# Patient Record
Sex: MALE | Race: WHITE | URBAN - METROPOLITAN AREA
[De-identification: names, ages, dates, MRNs, and addresses within clinical notes are randomized per-mention and may not be internally consistent; named-entity substitution may affect disease eponyms.]

---

## 2023-04-28 ENCOUNTER — HOSPITAL ENCOUNTER (EMERGENCY)
Age: 48
Discharge: HOME | End: 2023-04-28
Attending: EMERGENCY MEDICINE
Payer: MEDICARE

## 2023-04-28 VITALS
HEART RATE: 75 BPM | TEMPERATURE: 98.9 F | HEIGHT: 73 IN | WEIGHT: 180 LBS | RESPIRATION RATE: 18 BRPM | SYSTOLIC BLOOD PRESSURE: 131 MMHG | DIASTOLIC BLOOD PRESSURE: 82 MMHG | OXYGEN SATURATION: 98 % | BODY MASS INDEX: 23.86 KG/M2

## 2023-04-28 DIAGNOSIS — R11.2 NAUSEA AND VOMITING, UNSPECIFIED VOMITING TYPE: ICD-10-CM

## 2023-04-28 DIAGNOSIS — F11.93 OPIOID WITHDRAWAL (HCC): Primary | ICD-10-CM

## 2023-04-28 PROCEDURE — 74011250636 HC RX REV CODE- 250/636: Performed by: EMERGENCY MEDICINE

## 2023-04-28 PROCEDURE — 74011636637 HC RX REV CODE- 636/637: Performed by: EMERGENCY MEDICINE

## 2023-04-28 PROCEDURE — 99283 EMERGENCY DEPT VISIT LOW MDM: CPT

## 2023-04-28 RX ORDER — ONDANSETRON 4 MG/1
4 TABLET, ORALLY DISINTEGRATING ORAL
Qty: 16 TABLET | Refills: 0 | Status: SHIPPED | OUTPATIENT
Start: 2023-04-28

## 2023-04-28 RX ORDER — BUPRENORPHINE AND NALOXONE 8; 2 MG/1; MG/1
1 FILM, SOLUBLE BUCCAL; SUBLINGUAL 2 TIMES DAILY
Qty: 10 FILM | Refills: 0 | Status: SHIPPED | OUTPATIENT
Start: 2023-04-28 | End: 2023-05-03

## 2023-04-28 RX ORDER — BUPRENORPHINE AND NALOXONE 8; 2 MG/1; MG/1
1 FILM, SOLUBLE BUCCAL; SUBLINGUAL
Status: COMPLETED | OUTPATIENT
Start: 2023-04-28 | End: 2023-04-28

## 2023-04-28 RX ORDER — ONDANSETRON 4 MG/1
4 TABLET, ORALLY DISINTEGRATING ORAL
Status: COMPLETED | OUTPATIENT
Start: 2023-04-28 | End: 2023-04-28

## 2023-04-28 RX ADMIN — BUPRENORPHINE AND NALOXONE 1 FILM: 8; 2 FILM BUCCAL; SUBLINGUAL at 11:47

## 2023-04-28 RX ADMIN — ONDANSETRON 4 MG: 4 TABLET, ORALLY DISINTEGRATING ORAL at 11:47

## 2023-04-28 NOTE — ED NOTES
Pt presents to Ed with c/o withdrawal symptoms. Pt recently moved from [place] to withdraw from heroin. Pt last used (snorted heroin) 4/27/23. Pt symptoms include nausea, chills, diarrhea.

## 2023-04-28 NOTE — DISCHARGE INSTRUCTIONS
You were seen in the emergency department for your symptoms. Please use the Zofran for nausea and the Suboxone as prescribed. Please follow-up with addiction medicine to continue Suboxone therapy. Return for new or worse symptoms anytime.

## 2023-04-28 NOTE — ED NOTES
Went over the patients discharge papers, patient verbalized understanding. He states his uncle wont be here for an hour so I showed him the waiting room where he could wait for his ride.   Pt took all his belonging and ambulated tot he waiting room with a steady gate

## 2023-04-28 NOTE — ED PROVIDER NOTES
[facility] EMERGENCY DEPT  EMERGENCY DEPARTMENT ENCOUNTER       Pt Name: Fransisca Cates  MRN: [id number]  [place] 1975  Date of evaluation: 4/28/2023  Provider: Melissa Santana MD   PCP: None  Note Started: 11:23 AM 4/28/23     CHIEF COMPLAINT       Chief Complaint   Patient presents with    Drug Overdose     Pt presents to Ed with c/o withdrawal symptoms. Pt recently moved from [place] to withdraw from heroin. Pt last used (snorted heroin) 4/27/23. Pt symptoms include nausea, chills, diarrhea. HISTORY OF PRESENT ILLNESS: 1 or more elements      History From: patient, History limited by: none     Fransisca Cates is a 51 y.o. male presents with opioid withdrawal.     25-year-old male with a history of opioid abuse who presents with daily heroin use presents requesting detox. Patient reports he last used heroin 1 day ago. Reports moved from [place] due to having \"too many drug dealers on speed dial.\" Patient reports he uses \"10 bags\" of heroin daily. Occasional marijuana abuse, but denies alcohol. Patient reports since his last use, he has began to have tremors and nausea with vomit. He is concerned that his withdrawal will worsen. Denies diarrhea. Denies abdominal pain. Denies chest pain or shortness of breath. Please See MDM for Additional Details of the HPI/PMH  Nursing Notes were all reviewed and agreed with or any disagreements were addressed in the HPI. REVIEW OF SYSTEMS        Positives and Pertinent negatives as per HPI. PAST HISTORY     Past Medical History:  No past medical history on file. Past Surgical History:  No past surgical history on file. Family History:  No family history on file. Social History: Allergies:   Allergies   Allergen Reactions    Codeine Hives    Keppra [Levetiracetam] Hives    Penicillins Hives       CURRENT MEDICATIONS      Discharge Medication List as of 4/28/2023 12:43 PM          SCREENINGS               No data recorded PHYSICAL EXAM      ED Triage Vitals [04/28/23 1102]   ED Encounter Vitals Group      /82      Pulse (Heart Rate) 75      Resp Rate 18      Temp 98.9 °F (37.2 °C)      Temp src       O2 Sat (%) 98 %      Weight 180 lb      Height 6' 1\"        Physical Exam  Vitals and nursing note reviewed. Constitutional:       Comments: 54-year-old male, resting bed, appears mildly comfortable but nontoxic   HENT:      Head: Normocephalic. Cardiovascular:      Rate and Rhythm: Normal rate and regular rhythm. Pulses: Normal pulses. Pulmonary:      Effort: Pulmonary effort is normal.   Abdominal:      General: Abdomen is flat. Tenderness: There is no abdominal tenderness. Musculoskeletal:         General: Normal range of motion. Skin:     General: Skin is warm. Neurological:      General: No focal deficit present. Mental Status: He is alert. Comments: Tremulous   Psychiatric:         Mood and Affect: Mood normal.        DIAGNOSTIC RESULTS   LABS:     No results found for this or any previous visit (from the past 12 hour(s)). EKG: If performed, independent interpretation documented below in the MDM section     RADIOLOGY:  Non-plain film images such as CT, Ultrasound and MRI are read by the radiologist. Plain radiographic images are visualized and preliminarily interpreted by the ED Provider with the findings documented in the MDM section. Interpretation per the Radiologist below, if available at the time of this note:     No results found.       PROCEDURES   Unless otherwise noted below, none  Procedures     CRITICAL CARE TIME   0    EMERGENCY DEPARTMENT COURSE and DIFFERENTIAL DIAGNOSIS/MDM   Vitals:    Vitals:    04/28/23 1102   BP: 131/82   Pulse: 75   Resp: 18   Temp: 98.9 °F (37.2 °C)   SpO2: 98%   Weight: 81.6 kg (180 lb)   Height: 6' 1\" (1.854 m)        Patient was given the following medications:  Medications   buprenorphine-naloxone (SUBOXONE) 8mg-2mg SL film (1 Film SubLINGual Given 4/28/23 1147)   ondansetron (ZOFRAN ODT) tablet 4 mg (4 mg Oral Given 4/28/23 1147)       Medical Decision Making  54-year-old male presents emerged department with a chief complaint of opioid withdrawal.  Vitals are unremarkable. He does appear to have an element of opiate withdrawal with nausea, no vomiting. We will attempt to initiate MAT with Suboxone in the ED. Holding on labs at this time. Patient's presentation confounded by social determinants of health including polysubstance abuse and poor support. Exam is fairly unremarkable with tremor noted and nausea. Risk  Prescription drug management. Diagnosis or treatment significantly limited by social determinants of health. ED Course as of 04/28/23 1424   Fri Apr 28, 2023   1229 Reassessed, symptoms improving. [MB]   1231 Consult with pharmacy for dosing of suboxone and initiation of MAT. [MB]   1231 Consult via [employer] with Dr. Kavitha Barriga for close outpatient follow-up and hand off. [MB]      ED Course User Index  [MB] Elia Le MD         FINAL IMPRESSION     1. Opioid withdrawal ([address]    2. Nausea and vomiting, unspecified vomiting type          DISPOSITION/PLAN   Odell Perry  results have been reviewed with him. He has been counseled regarding his diagnosis, treatment, and plan. He verbally conveys understanding and agreement of the signs, symptoms, diagnosis, treatment and prognosis and additionally agrees to follow up as discussed. He also agrees with the care-plan and conveys that all of his questions have been answered. I have also provided discharge instructions for him that include: educational information regarding their diagnosis and treatment, and list of reasons why they would want to return to the ED prior to their follow-up appointment, should his condition change.      CLINICAL IMPRESSION    Discharged     PATIENT REFERRED TO:  Follow-up Information       Follow up With Specialties Details Why Contact Info    [facility] EMERGENCY DEPT Emergency Medicine  If symptoms worsen [address] [place] [address]  [phone]    Dilia Mead MD Addiction Medicine [facility] In 3 days  [address] [place]  [address] [address]  [phone]                DISCHARGE MEDICATIONS:  Discharge Medication List as of 4/28/2023 12:43 PM        START taking these medications    Details   buprenorphine-naloxone (Suboxone) 8-2 mg film sublingaul film 1 Film by SubLINGual route two (2) times a day for 5 days. Max Daily Amount: 2 Film., Normal, Disp-10 Film, R-0.      ondansetron (ZOFRAN ODT) 4 mg disintegrating tablet Take 1 Tablet by mouth every eight (8) hours as needed for Nausea or Vomiting., Normal, Disp-16 Tablet, R-0               DISCONTINUED MEDICATIONS:  Discharge Medication List as of 4/28/2023 12:43 PM          I am the Primary Clinician of Record. Aleksandra Fontaine MD (electronically signed)    (Please note that parts of this dictation were completed with voice recognition software. Quite often unanticipated grammatical, syntax, homophones, and other interpretive errors are inadvertently transcribed by the computer software. Please disregards these errors.  Please excuse any errors that have escaped final proofreading.)